# Patient Record
Sex: MALE | Race: BLACK OR AFRICAN AMERICAN
[De-identification: names, ages, dates, MRNs, and addresses within clinical notes are randomized per-mention and may not be internally consistent; named-entity substitution may affect disease eponyms.]

---

## 2019-11-11 ENCOUNTER — HOSPITAL ENCOUNTER (OUTPATIENT)
Dept: HOSPITAL 61 - ECHO | Age: 79
Discharge: HOME | End: 2019-11-11
Attending: INTERNAL MEDICINE
Payer: MEDICARE

## 2019-11-11 DIAGNOSIS — I08.8: Primary | ICD-10-CM

## 2019-11-11 PROCEDURE — 93306 TTE W/DOPPLER COMPLETE: CPT

## 2019-11-11 NOTE — CARD
MR#: [id number]

Account#: [id number]

Accession#: [id number]

Date of Study: 11/11/2019

Ordering Physician: EDDIE RODRIGUEZ, 

Referring Physician: EDDIE RODRIGUEZ, 

Katherin: Kymberly Sommer





--------------- APPROVED REPORT --------------





EXAM: Two-dimensional and M-mode echocardiogram with Doppler and color Doppler.



Other Information 

Quality : AverageHR: 60bpm



INDICATION

Murmur 



2D DIMENSIONS 

RVDd2.4 (2.9-3.5cm)Left Atrium(2D)2.8 (1.6-4.0cm)

IVSd1.7 (0.7-1.1cm)Aortic Root(2D)2.4 (2.0-3.7cm)

LVDd3.5 (3.9-5.9cm)LVOT Diameter2.0 (1.8-2.4cm)

PWd1.1 (0.7-1.1cm)LVDs2.1 (2.5-4.0cm)

FS (%) 39.8 %SV37.2 ml



Aortic Valve

AoV Peak Esvin.144.8cm/sAoV VTI32.4cm

AO Peak GR.8.4mmHgLVOT Peak Esvin.115.1cm/s

AO Mean GR.5mmHgAVA (VMAX)2.39cm2



Mitral Valve

MV E Mwjepboo53.7cm/sMV E Peak Gr.4mmHg

MV DECEL [id number] A Tfhmgrsj55.5cm/s

MV E Mean Gr.1mmHgE/A  Ratio0.9



Pulmonary Valve

PV Peak Btsntfho768.4cm/s



Tricuspid Valve

TR P. Xcmlbpos117vj/sRAP ASIZJVTA3yfJg

TR Peak Gr.55mrTtXFGR47bpFm



Pulmonary Vein

S1 Oojbjwje26.9cm/sD2 Fikzrfsp41.1cm/s



 LEFT VENTRICLE 

The left ventricle is normal size. There is mild concentric left ventricular hypertrophy. The left ve
ntricular systolic function is normal and the ejection fraction is within normal range. The Ejection 
Fraction is 60-65%. There is normal LV segmental wall motion. Transmitral Doppler flow pattern is Gra
de I-abnormal relaxation pattern.



 RIGHT VENTRICLE 

The right ventricle is normal size. The right ventricular systolic function is normal.



 ATRIA 

The left atrium size is normal. The right atrium size is normal. The interatrial septum is intact wit
h no evidence for an atrial septal defect or patent foramen ovale as noted on 2-D or Doppler imaging.




 AORTIC VALVE 

The aortic valve is mildly thickened but opens well. Doppler and Color Flow revealed no significant a
ortic regurgitation. There is no significant aortic valvular stenosis.



 MITRAL VALVE 

The mitral valve is mildly thickened. There is no evidence of mitral valve prolapse. There is no mitr
al valve stenosis. Doppler and Color-flow revealed trace to mild mitral regurgitation.



 TRICUSPID VALVE 

The tricuspid valve is normal in structure and function. Doppler and Color Flow revealed mild tricusp
id regurgitation with an estimated PAP of 24 mmHg. There is no tricuspid valve stenosis. 



 PULMONIC VALVE 

The pulmonary valve is normal in structure and function. Doppler and Color Flow revealed mild pulmoni
c valvular regurgitation.



 GREAT VESSELS 

The aortic root is normal in size. The ascending aorta is normal in size. The IVC is normal in size a
nd collapses >50% with inspiration.



 PERICARDIAL EFFUSION 

There is no pleural effusion. There is no evidence of significant pericardial effusion.



Critical Notification

Critical Value: No



<Conclusion>

The left ventricle is normal size.

The left ventricular systolic function is normal and the ejection fraction is within normal range.

The Ejection Fraction is 60-65%.

There is mild concentric left ventricular hypertrophy.

Doppler and Color Flow revealed no significant aortic regurgitation.

There is no significant aortic valvular stenosis.

Doppler and Color-flow revealed trace to mild mitral regurgitation.

Doppler and Color Flow revealed mild tricuspid regurgitation with an estimated PAP of 24 mmHg.



Signed by : Eddie Rodriguez MD

Electronically Approved : 11/11/2019 09:27:42